# Patient Record
Sex: MALE | Race: WHITE | NOT HISPANIC OR LATINO
[De-identification: names, ages, dates, MRNs, and addresses within clinical notes are randomized per-mention and may not be internally consistent; named-entity substitution may affect disease eponyms.]

---

## 2020-08-31 ENCOUNTER — DASHBOARD ENCOUNTERS (OUTPATIENT)
Age: 55
End: 2020-08-31

## 2020-08-31 ENCOUNTER — OFFICE VISIT (OUTPATIENT)
Dept: URBAN - NONMETROPOLITAN AREA CLINIC 2 | Facility: CLINIC | Age: 55
End: 2020-08-31
Payer: SELF-PAY

## 2020-08-31 ENCOUNTER — LAB OUTSIDE AN ENCOUNTER (OUTPATIENT)
Dept: URBAN - NONMETROPOLITAN AREA CLINIC 2 | Facility: CLINIC | Age: 55
End: 2020-08-31

## 2020-08-31 DIAGNOSIS — I81 PORTAL VEIN THROMBOSIS: ICD-10-CM

## 2020-08-31 DIAGNOSIS — K70.30 ALCOHOLIC CIRRHOSIS OF LIVER WITHOUT ASCITES: ICD-10-CM

## 2020-08-31 DIAGNOSIS — R60.1 GENERALIZED EDEMA: ICD-10-CM

## 2020-08-31 DIAGNOSIS — T14.8XXA BRUISING: ICD-10-CM

## 2020-08-31 DIAGNOSIS — R10.84 GENERALIZED ABDOMINAL PAIN: ICD-10-CM

## 2020-08-31 PROBLEM — 125667009: Status: ACTIVE | Noted: 2020-08-31

## 2020-08-31 PROBLEM — 420054005: Status: ACTIVE | Noted: 2020-08-31

## 2020-08-31 PROBLEM — 102614006: Status: ACTIVE | Noted: 2020-08-31

## 2020-08-31 PROCEDURE — G8427 DOCREV CUR MEDS BY ELIG CLIN: HCPCS | Performed by: NURSE PRACTITIONER

## 2020-08-31 PROCEDURE — 3017F COLORECTAL CA SCREEN DOC REV: CPT | Performed by: NURSE PRACTITIONER

## 2020-08-31 PROCEDURE — G8420 CALC BMI NORM PARAMETERS: HCPCS | Performed by: NURSE PRACTITIONER

## 2020-08-31 PROCEDURE — 99204 OFFICE O/P NEW MOD 45 MIN: CPT | Performed by: NURSE PRACTITIONER

## 2020-08-31 RX ORDER — ASPIRIN 81 MG/1
TAKE 1 TABLET (81 MG) BY ORAL ROUTE ONCE DAILY TABLET, COATED ORAL 1
Qty: 0 | Refills: 0 | Status: ON HOLD | COMMUNITY
Start: 1900-01-01

## 2020-08-31 RX ORDER — FUROSEMIDE 20 MG/1
1 TABLET TABLET ORAL ONCE A DAY
Qty: 30 TABLET | Refills: 3 | OUTPATIENT
Start: 2020-08-31

## 2020-08-31 RX ORDER — SPIRONOLACTONE 25 MG/1
TAKE ONE-HALF TABLET BY MOUTH ONCE DAILY TABLET ORAL
Qty: 30 | Refills: 3 | Status: ON HOLD | COMMUNITY
Start: 2018-03-26

## 2020-08-31 RX ORDER — PANTOPRAZOLE SODIUM 40 MG/1
TAKE 1 TABLET (40 MG) BY ORAL ROUTE ONCE DAILY FOR REFLUX TABLET, DELAYED RELEASE ORAL
Qty: 30 | Refills: 1 | Status: ON HOLD | COMMUNITY
Start: 2018-06-19

## 2020-08-31 RX ORDER — WARFARIN 5 MG/1
TABLET ORAL
Qty: 0 | Refills: 0 | Status: ON HOLD | COMMUNITY
Start: 1900-01-01

## 2020-08-31 RX ORDER — NADOLOL 20 MG/1
TAKE 0.5 TABLET BY ORAL ROUTE DAILY TABLET ORAL 1
Qty: 30 | Refills: 6 | Status: ON HOLD | COMMUNITY
Start: 2017-07-21

## 2020-08-31 RX ORDER — SPIRONOLACTONE 50 MG/1
1 TABLET TABLET, FILM COATED ORAL ONCE A DAY
Qty: 30 TABLET | Refills: 3 | OUTPATIENT
Start: 2020-08-31 | End: 2020-12-28

## 2020-08-31 NOTE — HPI-TODAY'S VISIT:
Mr. Boris Andrews is a 55 year old male here for Cirrhosis. He was last seen in the [place] office but he had lost insurance and did not f/u. He was diagnosed with alcohol related cirrhosis in 2016. He has a hx of varices, ascites, and portal vein thrombosis. He has not been on any medications since 2018. He has been doing well until last week. He has started having swelling in his legs and arms. He has severe ecchymosis of his arms and abdomen. He denies any nausea, vomiting or rectal bleeding. He went to the ER yesterday for this with normal platelets and PT/INR. He did not have any imaging. He has been taking a lot of NSAIDs for the pain. He puts salt on everything. He has a focal swelling of his RUQ with bruising under. He finds it hard to take a deep breath. He denies any dizziness or chest pain. CS

## 2020-08-31 NOTE — HPI-OTHER HISTORIES
7/5/2017 This is a scheduled follow-up appointment for this patient, a 52 year old /White male, after a previous visit on 01/05/2017, for an evaluation of cirrhosis. A copy of this document will be sent to the referring provider. The patient does have an established diagnosis of cirrhosis. They have been cirrhotic for 1 year. They report fatigue and increased anxiety and dizziness.  The patient relates a personal history of: Alcoholic Cirrhosis. He states no history of new medications or alcohol use. The patient reports a personal history of no other habits of significance.  Interval testing has not been done.     Patient's naldol was cut in 1/2 at LOV 6 months ago.  HOwever, HR continues to be in the 40s.  He does report feeling sluggish, dizzy (especially with changing positions suddenly) and lightheaded at times.  Overall, cirrhosis is managed at this time.  He is also c/o increased anxiety or feeling "on edge" especially when he is in large crowds.  This also causes some insomnia at night.  Currently takes Aldactone 1/2 tablet every other day and 5 mg of Naldolol.  He is currently without insurance and does not have a routine PCP.

## 2020-08-31 NOTE — PHYSICAL EXAM GASTROINTESTINAL
Abdomen , soft, nontender, nondistended , no guarding or rigidity , no masses palpable , normal bowel sounds , Liver and Spleen ,  no hepatosplenomegaly , purtution of RUQ with tenderness

## 2020-08-31 NOTE — PHYSICAL EXAM SKIN:
discoloration of his arms and abdomen , no jaundice present , good turgor , no masses , no tenderness on palpation, pitting edema of his arms and legs

## 2020-08-31 NOTE — PHYSICAL EXAM CONSTITUTIONAL:
well developed, thin , in no acute distress , ambulating without difficulty , normal communication ability

## 2020-09-02 ENCOUNTER — TELEPHONE ENCOUNTER (OUTPATIENT)
Dept: URBAN - METROPOLITAN AREA CLINIC 92 | Facility: CLINIC | Age: 55
End: 2020-09-02

## 2020-09-02 ENCOUNTER — LAB OUTSIDE AN ENCOUNTER (OUTPATIENT)
Dept: URBAN - METROPOLITAN AREA CLINIC 92 | Facility: CLINIC | Age: 55
End: 2020-09-02

## 2020-09-10 ENCOUNTER — CLAIMS CREATED FROM THE CLAIM WINDOW (OUTPATIENT)
Dept: URBAN - METROPOLITAN AREA CLINIC 4 | Facility: CLINIC | Age: 55
End: 2020-09-10
Payer: SELF-PAY

## 2020-09-10 ENCOUNTER — OFFICE VISIT (OUTPATIENT)
Dept: URBAN - NONMETROPOLITAN AREA SURGERY CENTER 1 | Facility: SURGERY CENTER | Age: 55
End: 2020-09-10
Payer: SELF-PAY

## 2020-09-10 DIAGNOSIS — D12.4 BENIGN NEOPLASM OF DESCENDING COLON: ICD-10-CM

## 2020-09-10 DIAGNOSIS — D12.5 ADENOMA OF SIGMOID COLON: ICD-10-CM

## 2020-09-10 DIAGNOSIS — R93.3 ABN FINDINGS-GI TRACT: ICD-10-CM

## 2020-09-10 PROCEDURE — G9937 DIG OR SURV COLSCO: HCPCS | Performed by: INTERNAL MEDICINE

## 2020-09-10 PROCEDURE — 88305 TISSUE EXAM BY PATHOLOGIST: CPT | Performed by: PATHOLOGY

## 2020-09-10 PROCEDURE — 45385 COLONOSCOPY W/LESION REMOVAL: CPT | Performed by: INTERNAL MEDICINE

## 2020-09-10 PROCEDURE — G8907 PT DOC NO EVENTS ON DISCHARG: HCPCS | Performed by: INTERNAL MEDICINE

## 2020-09-16 ENCOUNTER — OFFICE VISIT (OUTPATIENT)
Dept: URBAN - NONMETROPOLITAN AREA CLINIC 2 | Facility: CLINIC | Age: 55
End: 2020-09-16